# Patient Record
Sex: FEMALE | Race: WHITE
[De-identification: names, ages, dates, MRNs, and addresses within clinical notes are randomized per-mention and may not be internally consistent; named-entity substitution may affect disease eponyms.]

---

## 2019-07-09 ENCOUNTER — HOSPITAL ENCOUNTER (OUTPATIENT)
Dept: HOSPITAL 95 - ORSCSDS | Age: 34
Discharge: HOME | End: 2019-07-09
Attending: INTERNAL MEDICINE
Payer: COMMERCIAL

## 2019-07-09 VITALS — HEIGHT: 65.98 IN | WEIGHT: 130.51 LBS | BODY MASS INDEX: 20.98 KG/M2

## 2019-07-09 DIAGNOSIS — R11.2: ICD-10-CM

## 2019-07-09 DIAGNOSIS — K29.70: ICD-10-CM

## 2019-07-09 DIAGNOSIS — Z79.899: ICD-10-CM

## 2019-07-09 DIAGNOSIS — R63.4: ICD-10-CM

## 2019-07-09 DIAGNOSIS — R10.13: Primary | ICD-10-CM

## 2019-07-09 DIAGNOSIS — J45.909: ICD-10-CM

## 2019-07-09 DIAGNOSIS — F17.210: ICD-10-CM

## 2019-07-09 DIAGNOSIS — B96.81: ICD-10-CM

## 2019-07-09 PROCEDURE — 0DB68ZX EXCISION OF STOMACH, VIA NATURAL OR ARTIFICIAL OPENING ENDOSCOPIC, DIAGNOSTIC: ICD-10-PCS | Performed by: INTERNAL MEDICINE

## 2019-07-09 NOTE — NUR
07/09/19 1543 Jennifer Case S
VITAL SIGNS CHARTED BY ORSC.NSC. ORSC.RXS WAS SIGNED IN COMPUTER AT
THE TIME SO WAS ACCIDENTALLY CHARTED ON HER NAME, [email or web address].

## 2020-10-28 ENCOUNTER — HOSPITAL ENCOUNTER (OUTPATIENT)
Dept: HOSPITAL 95 - LAB | Age: 35
Discharge: HOME | End: 2020-10-28
Attending: OBSTETRICS & GYNECOLOGY
Payer: COMMERCIAL

## 2020-10-28 DIAGNOSIS — Z01.419: Primary | ICD-10-CM

## 2020-10-28 PROCEDURE — G0123 SCREEN CERV/VAG THIN LAYER: HCPCS

## 2020-10-30 LAB — OTHER STN SPEC: (no result)

## 2021-08-04 ENCOUNTER — HOSPITAL ENCOUNTER (EMERGENCY)
Dept: HOSPITAL 95 - ER | Age: 36
Discharge: HOME | End: 2021-08-04
Payer: COMMERCIAL

## 2021-08-04 VITALS — HEIGHT: 66 IN | BODY MASS INDEX: 19.44 KG/M2 | WEIGHT: 120.99 LBS

## 2021-08-04 DIAGNOSIS — E11.9: ICD-10-CM

## 2021-08-04 DIAGNOSIS — J45.909: ICD-10-CM

## 2021-08-04 DIAGNOSIS — K85.90: Primary | ICD-10-CM

## 2021-08-04 DIAGNOSIS — F17.200: ICD-10-CM

## 2021-08-04 LAB
ALBUMIN SERPL BCP-MCNC: 3.5 G/DL (ref 3.4–5)
ALBUMIN/GLOB SERPL: 1.2 {RATIO} (ref 0.8–1.8)
ALT SERPL W P-5'-P-CCNC: 28 U/L (ref 12–78)
ANION GAP SERPL CALCULATED.4IONS-SCNC: 4 MMOL/L (ref 6–16)
AST SERPL W P-5'-P-CCNC: 17 U/L (ref 12–37)
BASOPHILS # BLD AUTO: 0.07 K/MM3 (ref 0–0.23)
BASOPHILS NFR BLD AUTO: 1 % (ref 0–2)
BILIRUB SERPL-MCNC: 0.7 MG/DL (ref 0.1–1)
BUN SERPL-MCNC: 10 MG/DL (ref 8–24)
CALCIUM SERPL-MCNC: 8.6 MG/DL (ref 8.5–10.1)
CHLORIDE SERPL-SCNC: 109 MMOL/L (ref 98–108)
CHOLEST SERPL-MCNC: 127 MG/DL (ref 50–200)
CO2 SERPL-SCNC: 27 MMOL/L (ref 21–32)
CREAT SERPL-MCNC: 0.69 MG/DL (ref 0.4–1)
DEPRECATED RDW RBC AUTO: 43.9 FL (ref 35.1–46.3)
EOSINOPHIL # BLD AUTO: 0.36 K/MM3 (ref 0–0.68)
EOSINOPHIL NFR BLD AUTO: 5 % (ref 0–6)
ERYTHROCYTE [DISTWIDTH] IN BLOOD BY AUTOMATED COUNT: 11.8 % (ref 11.7–14.2)
GLOBULIN SER CALC-MCNC: 3 G/DL (ref 2.2–4)
GLUCOSE SERPL-MCNC: 103 MG/DL (ref 70–99)
HCT VFR BLD AUTO: 40.5 % (ref 33–51)
HGB BLD-MCNC: 13.9 G/DL (ref 11.5–16)
IMM GRANULOCYTES # BLD AUTO: 0.01 K/MM3 (ref 0–0.1)
IMM GRANULOCYTES NFR BLD AUTO: 0 % (ref 0–1)
LYMPHOCYTES # BLD AUTO: 3.18 K/MM3 (ref 0.84–5.2)
LYMPHOCYTES NFR BLD AUTO: 42 % (ref 21–46)
MCHC RBC AUTO-ENTMCNC: 34.3 G/DL (ref 31.5–36.5)
MCV RBC AUTO: 101 FL (ref 80–100)
MONOCYTES # BLD AUTO: 0.5 K/MM3 (ref 0.16–1.47)
MONOCYTES NFR BLD AUTO: 7 % (ref 4–13)
NEUTROPHILS # BLD AUTO: 3.46 K/MM3 (ref 1.96–9.15)
NEUTROPHILS NFR BLD AUTO: 46 % (ref 41–73)
NRBC # BLD AUTO: 0 K/MM3 (ref 0–0.02)
NRBC BLD AUTO-RTO: 0 /100 WBC (ref 0–0.2)
PLATELET # BLD AUTO: 278 K/MM3 (ref 150–400)
POTASSIUM SERPL-SCNC: 3.3 MMOL/L (ref 3.5–5.5)
PROT SERPL-MCNC: 6.5 G/DL (ref 6.4–8.2)
SODIUM SERPL-SCNC: 140 MMOL/L (ref 136–145)
TRIGL SERPL-MCNC: 64 MG/DL (ref 30–140)

## 2021-08-04 PROCEDURE — A9270 NON-COVERED ITEM OR SERVICE: HCPCS

## 2021-09-12 ENCOUNTER — HOSPITAL ENCOUNTER (OUTPATIENT)
Dept: HOSPITAL 95 - ER | Age: 36
Setting detail: OBSERVATION
LOS: 1 days | Discharge: HOME | End: 2021-09-13
Attending: STUDENT IN AN ORGANIZED HEALTH CARE EDUCATION/TRAINING PROGRAM | Admitting: STUDENT IN AN ORGANIZED HEALTH CARE EDUCATION/TRAINING PROGRAM
Payer: COMMERCIAL

## 2021-09-12 VITALS — BODY MASS INDEX: 20.89 KG/M2 | WEIGHT: 130.01 LBS | HEIGHT: 66 IN

## 2021-09-12 DIAGNOSIS — R41.82: ICD-10-CM

## 2021-09-12 DIAGNOSIS — F12.90: ICD-10-CM

## 2021-09-12 DIAGNOSIS — T39.1X1A: ICD-10-CM

## 2021-09-12 DIAGNOSIS — E11.9: ICD-10-CM

## 2021-09-12 DIAGNOSIS — F19.10: ICD-10-CM

## 2021-09-12 DIAGNOSIS — Z91.040: ICD-10-CM

## 2021-09-12 DIAGNOSIS — Z63.8: ICD-10-CM

## 2021-09-12 DIAGNOSIS — F17.210: ICD-10-CM

## 2021-09-12 DIAGNOSIS — Z20.822: ICD-10-CM

## 2021-09-12 DIAGNOSIS — J45.909: ICD-10-CM

## 2021-09-12 DIAGNOSIS — T43.211A: Primary | ICD-10-CM

## 2021-09-12 LAB
ALBUMIN SERPL BCP-MCNC: 3.2 G/DL (ref 3.4–5)
ALBUMIN/GLOB SERPL: 1.1 {RATIO} (ref 0.8–1.8)
ALT SERPL W P-5'-P-CCNC: 33 U/L (ref 12–78)
ANION GAP SERPL CALCULATED.4IONS-SCNC: 3 MMOL/L (ref 6–16)
APAP SERPL-MCNC: 101.7 UG/ML (ref 10–30)
AST SERPL W P-5'-P-CCNC: 26 U/L (ref 12–37)
BASOPHILS # BLD AUTO: 0.07 K/MM3 (ref 0–0.23)
BASOPHILS NFR BLD AUTO: 1 % (ref 0–2)
BILIRUB SERPL-MCNC: 0.8 MG/DL (ref 0.1–1)
BUN SERPL-MCNC: 9 MG/DL (ref 8–24)
CALCIUM SERPL-MCNC: 8 MG/DL (ref 8.5–10.1)
CHLORIDE SERPL-SCNC: 108 MMOL/L (ref 98–108)
CO2 SERPL-SCNC: 26 MMOL/L (ref 21–32)
CREAT SERPL-MCNC: 0.67 MG/DL (ref 0.4–1)
DEPRECATED RDW RBC AUTO: 41.9 FL (ref 35.1–46.3)
EOSINOPHIL # BLD AUTO: 0.25 K/MM3 (ref 0–0.68)
EOSINOPHIL NFR BLD AUTO: 3 % (ref 0–6)
ERYTHROCYTE [DISTWIDTH] IN BLOOD BY AUTOMATED COUNT: 11.5 % (ref 11.7–14.2)
ETHANOL SERPL-MCNC: <3 MG/DL
GLOBULIN SER CALC-MCNC: 3 G/DL (ref 2.2–4)
GLUCOSE SERPL-MCNC: 128 MG/DL (ref 70–99)
HCT VFR BLD AUTO: 37.4 % (ref 33–51)
HGB BLD-MCNC: 13.2 G/DL (ref 11.5–16)
IMM GRANULOCYTES # BLD AUTO: 0.02 K/MM3 (ref 0–0.1)
IMM GRANULOCYTES NFR BLD AUTO: 0 % (ref 0–1)
LYMPHOCYTES # BLD AUTO: 2.87 K/MM3 (ref 0.84–5.2)
LYMPHOCYTES NFR BLD AUTO: 31 % (ref 21–46)
MCHC RBC AUTO-ENTMCNC: 35.3 G/DL (ref 31.5–36.5)
MCV RBC AUTO: 100 FL (ref 80–100)
MONOCYTES # BLD AUTO: 0.69 K/MM3 (ref 0.16–1.47)
MONOCYTES NFR BLD AUTO: 8 % (ref 4–13)
NEUTROPHILS # BLD AUTO: 5.34 K/MM3 (ref 1.96–9.15)
NEUTROPHILS NFR BLD AUTO: 58 % (ref 41–73)
NRBC # BLD AUTO: 0 K/MM3 (ref 0–0.02)
NRBC BLD AUTO-RTO: 0 /100 WBC (ref 0–0.2)
PLATELET # BLD AUTO: 290 K/MM3 (ref 150–400)
POTASSIUM SERPL-SCNC: 3.7 MMOL/L (ref 3.5–5.5)
PROT SERPL-MCNC: 6.2 G/DL (ref 6.4–8.2)
SALICYLATES SERPL-MCNC: 2.3 MG/DL (ref 2.8–20)
SARS-COV-2 RNA RESP QL NAA+PROBE: NEGATIVE
SODIUM SERPL-SCNC: 137 MMOL/L (ref 136–145)
TSH SERPL DL<=0.005 MIU/L-ACNC: 2.25 UIU/ML (ref 0.36–4.8)

## 2021-09-12 PROCEDURE — G0480 DRUG TEST DEF 1-7 CLASSES: HCPCS

## 2021-09-12 PROCEDURE — U0004 COV-19 TEST NON-CDC HGH THRU: HCPCS

## 2021-09-12 SDOH — SOCIAL STABILITY - SOCIAL INSECURITY: OTHER SPECIFIED PROBLEMS RELATED TO PRIMARY SUPPORT GROUP: Z63.8

## 2021-09-13 LAB
ALBUMIN SERPL BCP-MCNC: 3.6 G/DL (ref 3.4–5)
ALBUMIN/GLOB SERPL: 0.9 {RATIO} (ref 0.8–1.8)
ALT SERPL W P-5'-P-CCNC: 36 U/L (ref 12–78)
AMPHETAMINES UR SCN-MCNC: DETECTED NG/ML
AMPHETAMINES UR-MCNC: DETECTED UG/L
ANION GAP SERPL CALCULATED.4IONS-SCNC: 4 MMOL/L (ref 6–16)
APAP SERPL-MCNC: <2 UG/ML (ref 10–30)
AST SERPL W P-5'-P-CCNC: 26 U/L (ref 12–37)
BILIRUB SERPL-MCNC: 0.7 MG/DL (ref 0.1–1)
BUN SERPL-MCNC: 9 MG/DL (ref 8–24)
CALCIUM SERPL-MCNC: 8.4 MG/DL (ref 8.5–10.1)
CANNABINOIDS UR QL: DETECTED
CHLORIDE SERPL-SCNC: 108 MMOL/L (ref 98–108)
CO2 SERPL-SCNC: 28 MMOL/L (ref 21–32)
CREAT SERPL-MCNC: 0.73 MG/DL (ref 0.4–1)
GLOBULIN SER CALC-MCNC: 3.8 G/DL (ref 2.2–4)
GLUCOSE SERPL-MCNC: 87 MG/DL (ref 70–99)
POTASSIUM SERPL-SCNC: 3.5 MMOL/L (ref 3.5–5.5)
PROT SERPL-MCNC: 7.4 G/DL (ref 6.4–8.2)
SODIUM SERPL-SCNC: 140 MMOL/L (ref 136–145)
SP GR SPEC: 1.01 (ref 1–1.02)
UROBILINOGEN UR STRIP-MCNC: (no result) MG/DL

## 2021-09-13 NOTE — NUR
Suicide safety ramona interview and Plan completed.  Pt reports "it was stupid"
amnd is glad she did not die.  No previous attempts.  Father found her being
drowsy and called for help.  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
through drug court 2017.  She relapsed in last month.  She has 3 children 15,
10,9 and lives with her  of 20 years.  She does have anxiety issues and
feels she has isolated herself to just "cookng and cleaning".  She reports
hisory of various family members that have  from suicide, and her
estranged mother was an addict.  She reports her huusband and her were
fighting prior to the attempt, as he learned of ehr relapse.  Both had been in
recovery.  Brief counseling regarding deep breathing, strengths
identification.  She is interested in individual counseling.  Recommend [facility]
folllow up appointment to be made prior to DC, with emphasis in substance
abuse.  Pt has ceased activities she used to enjoy of attending [employer],
attending [employer].  Pt was tearful at times when discussing how she
has "lost herself" and does not engage in any activities that are just for
her. Plan given to  patient.  Oly Pino M.Ed., [facility], Behavior Health
Director.

## 2021-09-13 NOTE — NUR
Spoke with Behavioral Health Director Oly Pino regarding her care this am.
Patient was pleasant evaluation during [facility] eval. Advised that she has
experienced a relapse with methamphetamine use roughly 30 days ago after 7
years clean. Had argument yesterday with her  regarding relapse  which
led to OD. Pt. open to one on one substance abuse canceling. Declines group
sessions. Per Oly, pt. likely to be released from a psych standpoint as she
has declined and intent to harm herself at this time. She is also seeking
help.
 
Patient's father Jaxon called to check on patient. He advised that he will
provide transport for pt. if her  is unavailable when she is
discharged.
Pt. note yet appropriate for discharge from clinical standpoint. Pt. declined
blood draw this am. Is now agreeable to have labs drawn. Anticipated needs at
time of discharge to include: Substance abuse counseling through [facility]
([facility] preferred) and hospital F/U with PCP within 5-7 days post
discharge.

## 2021-09-13 NOTE — NUR
Per Dr. Aleman, pt. appropriate for discharge. I was unable to make it to
patient's room prior to discharge. Pt. will need appt. with [facility] for
substance abuse counceling and hospital F/U with PCP within the next 5-7 days.
[facility] team and PCP notified.

## 2021-09-13 NOTE — NUR
Patient was involved in safety planning and very open to planning for her
wellness.  She was afraid of having to attend groups, and voiced relief that
individual therapy was available.  She prefers if she could be seen in
[place].  Pt reported she had just woken up this am when blood draw tried.
She said she was not nice, and felt bad -but was glad the labs could be drawn
again.

## 2021-09-13 NOTE — NUR
SHIFT SUMMARY
A/OX3, PT SLEEPING MOST OF SHIFT. MOD SI PRECAUTIONS IN PLACE, PT DENIES ANY
SUICIDAL THOUGHTS AT THIS TIME. LABILE MOOD WITH BRIEF PERIODS OF AGITATION
D/T SI PRECAUTIONS AND POLICIES. VSS, NO ACUTE CHANGES AT THIS TIME. BED IN
LOWEST POSITION WITH CALL LIGHT IN REACH. WILL CONTINUE TO MONITOR AND REPORT
TO ONCOMING RN.

## 2021-09-13 NOTE — NUR
PT IS ALERT ORIENTED X 4, PT DENIES PAIN,N/V,SOB,AND ANY SUICIDAL IDEAS,VERY
PLEASANT AND COORPORATIVE, PT STATED IT WAS A STUPID IDEA . PT IS IN BED,BED
IN LOW POSITION, WILL CONTINUE TO MONITOR

## 2021-09-13 NOTE — NUR
UPDATE 09/13/21: PT. PRESENTED TO ED YESTERDAY VIA EMS AFTER OD OF TRAZADONE
AND TYLENOL. PER CHART REVIEW, PT. INFORMED DR. LAZCANO THAT OD WAS
UNINTENTIONAL AND THAT SHE WAS HAVING A "BAD DAY." PT. WAS PLACED ON
APPROPRIATE PRECAUTIONS DUE TO CONCERN FOR POTENTIAL RISK OF SI. [facility] EVAL
REQUESTED BY DR. LAZCANO.
 
PER CHART REVIEW, THIS AM PT. HAS REFUSED LABS. PT. HAS BEEN AGITATED
THROUGHOUT HER HOSPITAL STAY. ACCUSING STAFF OFF TAKING HER PHONE AND
FRUSTRATED WITH THE NEED FOR SAFETY PRECAUTIONS. PT. HAS BEEN IN CONTACT WITH
HER . PT. POTENTIALLY APPROPRIATE FOR D/C WITHIN THE NEXT 24-48 HOURS.
ANTICIPATE NEEDS AT TIME OF DISCHARGE TO INCLUDE: HOSPITAL F/U WITHIN 5-7 DAYS
WITH PCP AND C F/U APPOINTMENT.

## 2021-09-14 ENCOUNTER — HOSPITAL ENCOUNTER (OUTPATIENT)
Dept: HOSPITAL 95 - LAB SHORT | Age: 36
Discharge: HOME | End: 2021-09-14
Attending: STUDENT IN AN ORGANIZED HEALTH CARE EDUCATION/TRAINING PROGRAM
Payer: COMMERCIAL

## 2021-09-14 DIAGNOSIS — Z51.81: Primary | ICD-10-CM

## 2021-09-14 DIAGNOSIS — Z79.899: ICD-10-CM

## 2021-09-14 PROCEDURE — G0480 DRUG TEST DEF 1-7 CLASSES: HCPCS

## 2021-09-22 ENCOUNTER — HOSPITAL ENCOUNTER (OUTPATIENT)
Dept: HOSPITAL 95 - LAB | Age: 36
Discharge: HOME | End: 2021-09-22
Attending: STUDENT IN AN ORGANIZED HEALTH CARE EDUCATION/TRAINING PROGRAM
Payer: COMMERCIAL

## 2021-09-22 DIAGNOSIS — R10.9: Primary | ICD-10-CM

## 2021-09-22 DIAGNOSIS — Z79.899: ICD-10-CM

## 2021-09-22 LAB — WBC #/AREA URNS HPF: (no result) /HPF (ref 0–5)

## 2022-06-11 ENCOUNTER — HOSPITAL ENCOUNTER (INPATIENT)
Dept: HOSPITAL 95 - ER | Age: 37
LOS: 5 days | Discharge: HOME | DRG: 920 | End: 2022-06-16
Attending: INTERNAL MEDICINE | Admitting: HOSPITALIST
Payer: COMMERCIAL

## 2022-06-11 VITALS — WEIGHT: 130.01 LBS | HEIGHT: 66 IN | BODY MASS INDEX: 20.89 KG/M2

## 2022-06-11 DIAGNOSIS — Z79.899: ICD-10-CM

## 2022-06-11 DIAGNOSIS — Z98.51: ICD-10-CM

## 2022-06-11 DIAGNOSIS — E11.9: ICD-10-CM

## 2022-06-11 DIAGNOSIS — J45.909: ICD-10-CM

## 2022-06-11 DIAGNOSIS — N73.8: ICD-10-CM

## 2022-06-11 DIAGNOSIS — Z20.822: ICD-10-CM

## 2022-06-11 DIAGNOSIS — K91.89: ICD-10-CM

## 2022-06-11 DIAGNOSIS — K56.7: ICD-10-CM

## 2022-06-11 DIAGNOSIS — K91.872: Primary | ICD-10-CM

## 2022-06-11 DIAGNOSIS — F17.210: ICD-10-CM

## 2022-06-11 DIAGNOSIS — Z91.040: ICD-10-CM
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
## 2022-06-11 LAB
ALBUMIN SERPL BCP-MCNC: 2.6 G/DL (ref 3.4–5)
ALBUMIN/GLOB SERPL: 0.6 {RATIO} (ref 0.8–1.8)
ALT SERPL W P-5'-P-CCNC: 31 U/L (ref 12–78)
ANION GAP SERPL CALCULATED.4IONS-SCNC: 5 MMOL/L (ref 6–16)
AST SERPL W P-5'-P-CCNC: 35 U/L (ref 12–37)
BASOPHILS # BLD AUTO: 0.05 K/MM3 (ref 0–0.23)
BASOPHILS NFR BLD AUTO: 0 % (ref 0–2)
BILIRUB SERPL-MCNC: 0.5 MG/DL (ref 0.1–1)
BUN SERPL-MCNC: 7 MG/DL (ref 8–24)
CALCIUM SERPL-MCNC: 9.1 MG/DL (ref 8.5–10.1)
CHLORIDE SERPL-SCNC: 105 MMOL/L (ref 98–108)
CO2 SERPL-SCNC: 30 MMOL/L (ref 21–32)
CREAT SERPL-MCNC: 0.6 MG/DL (ref 0.4–1)
DEPRECATED RDW RBC AUTO: 48 FL (ref 35.1–46.3)
EOSINOPHIL # BLD AUTO: 0.33 K/MM3 (ref 0–0.68)
EOSINOPHIL NFR BLD AUTO: 3 % (ref 0–6)
ERYTHROCYTE [DISTWIDTH] IN BLOOD BY AUTOMATED COUNT: 12.7 % (ref 11.7–14.2)
GLOBULIN SER CALC-MCNC: 4.6 G/DL (ref 2.2–4)
GLUCOSE SERPL-MCNC: 113 MG/DL (ref 70–99)
HCT VFR BLD AUTO: 37.1 % (ref 33–51)
HGB BLD-MCNC: 12.5 G/DL (ref 11.5–16)
IMM GRANULOCYTES # BLD AUTO: 0.05 K/MM3 (ref 0–0.1)
IMM GRANULOCYTES NFR BLD AUTO: 0 % (ref 0–1)
LYMPHOCYTES # BLD AUTO: 2.06 K/MM3 (ref 0.84–5.2)
LYMPHOCYTES NFR BLD AUTO: 18 % (ref 21–46)
MCHC RBC AUTO-ENTMCNC: 33.7 G/DL (ref 31.5–36.5)
MCV RBC AUTO: 101 FL (ref 80–100)
MONOCYTES # BLD AUTO: 0.78 K/MM3 (ref 0.16–1.47)
MONOCYTES NFR BLD AUTO: 7 % (ref 4–13)
NEUTROPHILS # BLD AUTO: 8.52 K/MM3 (ref 1.96–9.15)
NEUTROPHILS NFR BLD AUTO: 72 % (ref 41–73)
NRBC # BLD AUTO: 0 K/MM3 (ref 0–0.02)
NRBC BLD AUTO-RTO: 0 /100 WBC (ref 0–0.2)
PLATELET # BLD AUTO: 617 K/MM3 (ref 150–400)
POTASSIUM SERPL-SCNC: 3.7 MMOL/L (ref 3.5–5.5)
PROT SERPL-MCNC: 7.2 G/DL (ref 6.4–8.2)
SODIUM SERPL-SCNC: 140 MMOL/L (ref 136–145)

## 2022-06-11 PROCEDURE — A9270 NON-COVERED ITEM OR SERVICE: HCPCS

## 2022-06-11 PROCEDURE — C9113 INJ PANTOPRAZOLE SODIUM, VIA: HCPCS

## 2022-06-12 LAB
ALBUMIN SERPL BCP-MCNC: 2.1 G/DL (ref 3.4–5)
ALBUMIN/GLOB SERPL: 0.6 {RATIO} (ref 0.8–1.8)
ALT SERPL W P-5'-P-CCNC: 26 U/L (ref 12–78)
ANION GAP SERPL CALCULATED.4IONS-SCNC: 6 MMOL/L (ref 6–16)
AST SERPL W P-5'-P-CCNC: 31 U/L (ref 12–37)
BASOPHILS # BLD AUTO: 0.06 K/MM3 (ref 0–0.23)
BASOPHILS NFR BLD AUTO: 1 % (ref 0–2)
BILIRUB SERPL-MCNC: 0.6 MG/DL (ref 0.1–1)
BUN SERPL-MCNC: 6 MG/DL (ref 8–24)
CALCIUM SERPL-MCNC: 8.1 MG/DL (ref 8.5–10.1)
CHLORIDE SERPL-SCNC: 108 MMOL/L (ref 98–108)
CO2 SERPL-SCNC: 26 MMOL/L (ref 21–32)
CREAT SERPL-MCNC: 0.56 MG/DL (ref 0.4–1)
DEPRECATED RDW RBC AUTO: 47.8 FL (ref 35.1–46.3)
EOSINOPHIL # BLD AUTO: 0.36 K/MM3 (ref 0–0.68)
EOSINOPHIL NFR BLD AUTO: 3 % (ref 0–6)
ERYTHROCYTE [DISTWIDTH] IN BLOOD BY AUTOMATED COUNT: 12.6 % (ref 11.7–14.2)
FLUAV RNA SPEC QL NAA+PROBE: NEGATIVE
FLUBV RNA SPEC QL NAA+PROBE: NEGATIVE
GLOBULIN SER CALC-MCNC: 3.7 G/DL (ref 2.2–4)
GLUCOSE SERPL-MCNC: 103 MG/DL (ref 70–99)
HCT VFR BLD AUTO: 33.7 % (ref 33–51)
HGB BLD-MCNC: 11 G/DL (ref 11.5–16)
IMM GRANULOCYTES # BLD AUTO: 0.05 K/MM3 (ref 0–0.1)
IMM GRANULOCYTES NFR BLD AUTO: 1 % (ref 0–1)
LYMPHOCYTES # BLD AUTO: 2.28 K/MM3 (ref 0.84–5.2)
LYMPHOCYTES NFR BLD AUTO: 22 % (ref 21–46)
MCHC RBC AUTO-ENTMCNC: 32.6 G/DL (ref 31.5–36.5)
MCV RBC AUTO: 103 FL (ref 80–100)
MONOCYTES # BLD AUTO: 0.68 K/MM3 (ref 0.16–1.47)
MONOCYTES NFR BLD AUTO: 6 % (ref 4–13)
NEUTROPHILS # BLD AUTO: 7.16 K/MM3 (ref 1.96–9.15)
NEUTROPHILS NFR BLD AUTO: 68 % (ref 41–73)
NRBC # BLD AUTO: 0 K/MM3 (ref 0–0.02)
NRBC BLD AUTO-RTO: 0 /100 WBC (ref 0–0.2)
PLATELET # BLD AUTO: 545 K/MM3 (ref 150–400)
POTASSIUM SERPL-SCNC: 3.3 MMOL/L (ref 3.5–5.5)
PROT SERPL-MCNC: 5.8 G/DL (ref 6.4–8.2)
PROT UR STRIP-MCNC: (no result) MG/DL
RSV RNA SPEC QL NAA+PROBE: NEGATIVE
SARS-COV-2 RNA RESP QL NAA+PROBE: NEGATIVE
SODIUM SERPL-SCNC: 140 MMOL/L (ref 136–145)
SP GR SPEC: 1.01 (ref 1–1.02)
UROBILINOGEN UR STRIP-MCNC: (no result) MG/DL

## 2022-06-12 NOTE — NUR
summary
pt admitted per er with distended abd and pain post surgery earlier in week
for perforated ulcer-surery was in [place]. pt groggy.answere some questions
then falls to sleep.pt inconsistent with answers at time. i was completing
admit assess and computer locked me out. will not allow me back in-day rn
fauzia to complete. pt reports if surgery was needed, she would want to go
back to [place] for sugery.day rn aware.

## 2022-06-12 NOTE — NUR
SHIFT SUMMARY
PT A&OX4, VSS/RA, DARWIN PO CLEAR LIQUID DIET, VOIDING WELL, 3 BMS TODAY. PAIN
MANAGED WITH 5 MG OXY AND 25 FENT Q4 PRN. ADMITTED FOR POSTOP ILEUS AND
ABSCESS, GEN SURGERY CONSULT: NO SURGICAL INTERVENTION PLANNED AT THIS TIME.
PT IS POD6 PERF GASTRIC ULCER, HAS STAPLES MIDLINE NATALEE; SURGERY DONE [facility]. WILL REPORT TO ONCOMING NOC RN.

## 2022-06-13 LAB
ANION GAP SERPL CALCULATED.4IONS-SCNC: 6 MMOL/L (ref 6–16)
BASOPHILS # BLD AUTO: 0.06 K/MM3 (ref 0–0.23)
BASOPHILS NFR BLD AUTO: 1 % (ref 0–2)
BUN SERPL-MCNC: 3 MG/DL (ref 8–24)
CALCIUM SERPL-MCNC: 8.5 MG/DL (ref 8.5–10.1)
CHLORIDE SERPL-SCNC: 108 MMOL/L (ref 98–108)
CO2 SERPL-SCNC: 25 MMOL/L (ref 21–32)
CREAT SERPL-MCNC: 0.47 MG/DL (ref 0.4–1)
DEPRECATED RDW RBC AUTO: 46.9 FL (ref 35.1–46.3)
EOSINOPHIL # BLD AUTO: 0.3 K/MM3 (ref 0–0.68)
EOSINOPHIL NFR BLD AUTO: 3 % (ref 0–6)
ERYTHROCYTE [DISTWIDTH] IN BLOOD BY AUTOMATED COUNT: 12.5 % (ref 11.7–14.2)
GLUCOSE SERPL-MCNC: 101 MG/DL (ref 70–99)
HCT VFR BLD AUTO: 34.1 % (ref 33–51)
HGB BLD-MCNC: 11.2 G/DL (ref 11.5–16)
IMM GRANULOCYTES # BLD AUTO: 0.03 K/MM3 (ref 0–0.1)
IMM GRANULOCYTES NFR BLD AUTO: 0 % (ref 0–1)
LYMPHOCYTES # BLD AUTO: 2.22 K/MM3 (ref 0.84–5.2)
LYMPHOCYTES NFR BLD AUTO: 23 % (ref 21–46)
MCHC RBC AUTO-ENTMCNC: 32.8 G/DL (ref 31.5–36.5)
MCV RBC AUTO: 102 FL (ref 80–100)
MONOCYTES # BLD AUTO: 0.58 K/MM3 (ref 0.16–1.47)
MONOCYTES NFR BLD AUTO: 6 % (ref 4–13)
NEUTROPHILS # BLD AUTO: 6.53 K/MM3 (ref 1.96–9.15)
NEUTROPHILS NFR BLD AUTO: 67 % (ref 41–73)
NRBC # BLD AUTO: 0 K/MM3 (ref 0–0.02)
NRBC BLD AUTO-RTO: 0 /100 WBC (ref 0–0.2)
PLATELET # BLD AUTO: 621 K/MM3 (ref 150–400)
POTASSIUM SERPL-SCNC: 4.2 MMOL/L (ref 3.5–5.5)
PROTHROMBIN TIME: 10.9 SEC (ref 9.7–11.5)
SODIUM SERPL-SCNC: 139 MMOL/L (ref 136–145)

## 2022-06-13 NOTE — NUR
PT HAVING BMS TONIGHT.ALTHOUGH,STILL REQUIRING IV PAIN MEDS AT TIMES AND IV
ZOFRAN FOR NAUSEA X2.PT DOES HAVE HX OF ANXIETY AND REPORTS ANXIETY REGARDING
POSSIBILITY OF GOING HOME SOON.SLEEPING QUIETLY AT THIS TIME.

## 2022-06-13 NOTE — NUR
SHIFT SUMMARY
ANXIOUS THROUGHOUT SHIFT. TOLERATING SIPS OF FULL LIQUIDS. REPORTS NAUSEA AND
BACK AND ABD PAIN. MEDICATED PER EMAR. BOWEL CARE STARTED. IV FLUIDS RUNNING.
VOIDING WELL. REPORTS BOWEL MOVEMENT AND GAS. PLAN FOR DRAIN OF FLUID
COLLECTION IN PELVIS BY RADIOLOGY TOMORROW 06/14/22. SEEN BY SPIRITUAL CARE
THIS SHIFT. PALLIATIVE CARE CONSULTED AND WILL SEE TOMORROW AM.

## 2022-06-13 NOTE — NUR
Spritual care consult received and processed. Patient is just returning to her
rm after a failed attempt at a procedure. Pt is defeated and exhausted. Pt
reveals that she has low coping skills and unhelpful yet well meaning
resources. She talks about how spun out she is over having complications with
her health. I explore with her some mindfulness practices and breathing
techniques which put her to sleep (along with the Ativan given earlier). Pt
becomes so sleepy that she begins to spill her soup on the way to her mouth. I
help her return her soup to the tray and allow to rest. Spiritual care will be
available next day.

## 2022-06-14 PROCEDURE — 0W9J30Z DRAINAGE OF PELVIC CAVITY WITH DRAINAGE DEVICE, PERCUTANEOUS APPROACH: ICD-10-PCS | Performed by: RADIOLOGY

## 2022-06-14 NOTE — NUR
POST DRAIN PLACEMENT
VERY TEARFULLY TRANSFERS SELF FROM GOURNEY TO BED. URECIL BAG w/ SS FLUID TO R
BUTTOCK; COMPRESSED. KPAD TO BUTTOCK, REPOSITIONS SELF TO L SIDE. EMOTIONAL &
UPSET SHE IS IN PAIN. REPEATS "OW" OVER & OVER AGAIN. ICE WATER & PO MEDS
GIVEN.

## 2022-06-14 NOTE — NUR
PATIENT IS AGREABLE TO THE DRAIN PLACEMENT TODAY. PT IS STILL VERY ANXIOUS
ABOUT IT. PO ATIVAN READY JUST PRIOR TO PROCEDURE

## 2022-06-14 NOTE — NUR
SHIFT SUMMARY
 
NO ACUTE CHANGES. PT CONTINUES TO HAVE CRAMPING ABD PAIN. PT REPORTS FLATUS,
BUT NO BMS THIS SHIFT. STAPLES TO ABD REMAIN NATALEE AND CDI. NPO AT MIDNIGHT FOR
IR DRAIN PLACEMENT TODAY. IVF INFUSING PER ORDERS. 1 ROXICODONE + TYLENOL FOR
PAIN. PT STILL VERY ANXIOUS AND EMOTIONAL ABOUT TREATMENT PLAN. SUPPORT PRN.
USES CALL LIGHT APPROPRIATELY.

## 2022-06-14 NOTE — NUR
SHIFT SUMMARY
PT VERY ANXIOUS TODAY, REPORTS PROCEDURE NOT HAPPENING THEN DECIDED IT WAS AND
INSTRUCTED NURSING STAFF AND EFM PROVIDER TO  MAKE SURE IT HAPPENS EVEN IF SHE
SAYS NO IN THE FUTURE. PT MEDICATED WITH ATIVAN AS ORDERED JUST PRIOR TO
PROCEDURE FOR ANXIETY, DRAIN PLACED AND SAMPLE SENT TO LAB, PT RETURNED TO
ROOM VERY PAINFUL AND WAS MEDICATED PER EMAR, PT SO AT BEDSIDE OFFERING
COMFORT AND SUPPORT. SURGEON REMOVED STAPLES FROM MIDLINE INCISION AND PLACED
STERI STRIPS. PT FED DINNER PER SURGEON OK AND TOLERATED WELL. NO OTHER EVENTS
THIS SHIFT, CALL LIGHT IN REACH, WILL CTM AND REPORT TO ONCOMING NOC RN.

## 2022-06-14 NOTE — NUR
NEW POLST completed with pt. Proxy decision maker is TAZ Landaverde, cousin
to pt. Ph # [phone].  Pt requersts Full Code and full treatment
for duration of time that she has normal brain activity detected.
Pal Care visit made to complete a POLST and review completion of an AD
with pt at her request. Pt had a spiritual care visit just prior to my visit.
Pt sitting cross-legged in bed and rocked back and forth t/o most of visit.
When she focused on conversation re: advanced care planning and AD she stopped
rocking. She has vacilated in past 24 hours whether or not to proceed with
procedure that is scary and anxiety producing to her. This morning she has
decided against it.  She wants to name a surrogate decision maker in the event
that she is unable to speak for herself. She does not want to burden her
father with that. He is her primary family member locally. She named her
Cousin, TAZ Landaverde, Ph# [phone] as her proxy decision maker. TAZ resides in
[facility]. She does not want her estranged or ex- to have any
information or say in her medical decision making. This was of great concern
to her. Pt's proxy lives out of state and pt did not have witnesses locally to
complete an AD with. We reviewed a blank one and how to complete it. I placed
a blank AD in pt's belongings bag so she could review with her cousin and dad
and complete at a later date. Pt expressed thanks for getting the POLST done
and her proxy decision maker documented.

## 2022-06-14 NOTE — NUR
Spiritual Care Referral follow Up.
Pt. is sitting up "[place] Style" in bed and rocking.  Pt. welcomes my visit,
and verbalizes gratitude for iliana White's referral to have this 
return. Pt. is unsettled by unclear diagnosis, and lack of information. Pt.
displays evidence of increased discomfort.  Pt. also verbalized a lack of
family support, and a broken family system. Through a calming presence and
theraputic listening, Pt.  displays evidence of reduced anxiety. With Pts.
permission, prayed with Pt.  Pt. verbalized gratitude for the spiritau care
visit.

## 2022-06-15 NOTE — NUR
SHIFT SUMMARY
 
NO ACUTE CHANGES. 1 ROXICODONE + TYLENOL FOR PAIN MANAGEMENT. TRANSGLUTEAL
DRAIN TO RIGHT BUTTOCK REMAINS INTACT AND PUTTING OUT LIGHT TAN/BROWN
DRAINAGE. 1 SBA TO BATHROOM. ABX PER ORDERS. CALL LIGHT WITHIN REACH.

## 2022-06-15 NOTE — NUR
SUMMARY: PT IS POD1 PIGTAIL DRAIN PLACEMENT. NO ACUTE CHANGE TODAY, VSS, A/O.
PT SLEEPING OFTEN TODAY AND IF NOT SLEEPING THAN OUTSIDE WITH BOYFRIEND.
SURGICAL SITES WNL. MINIMAL SS OUTPUT FROM DRAIN. PT CONTINUES TO BE PAINFUL,
BUT X2 ROXICODONE AND TYLENOL HAS SEEMED TO REDUCE PAIN. NO SAFETY CONCERNS,
WILL CTM AND REPORT TO NOC RN.

## 2022-06-16 NOTE — NUR
pt laying in bed awake a/ox3, cooperative with care, states she's in 9/10
pain, had a really rough night last night, is scheduled to go to ct this am,
medicated with fentanyl for pain relief, having axiety about her condition
reports she doesn't feel like she's getting better, and about the ct scan,
lungs are clear a bit dim in bases, denies cough, on r/a, resp even and
unlabored, no cough noted, hrr, no edema noted, ppp+2, cap refill <3sec, v
stable, afebrile, piv site to rac is clear and patent, abd flat soft tender,
has drain to buttock, midline incision, healing, reports no bm for two days,
skin c/w/d, maew, able to stand and ambulate to bathroom, call light in reach,
left for ct via wheelchair.

## 2022-06-16 NOTE — NUR
Pt has been discharged to home, removed iv intact, went over her discharge
paperwork, she verbalized understanding, hard copy scripts placed in her
discharge pkt, she is aware, new medication called into [facility], pt
waiting on a ride.

## 2022-06-16 NOTE — NUR
PT IS A&OX4, SBA TO BR AND IS ABLE TO MAKE NEEDS KNOWN. PT HAS DRAIN IN R
TRANSGLUTEAL DRAIN, PUTTING OR SCANT SEROSANG DRAINAGE. NO BM THIS SHIFT, PT
COMPLAINS OF ABD PAIN AND STATES "I CAN FEEL THE DRAIN IN ME," AND "THE DRAIN
IS CAUSING ME PAIN." PRN OXY 10 MG GIVEN TO PT AS WELL AS FENTANYL AND ZOFRAN.
PT ABLE TO AMBULATE THE HALLS INDEPENDENTLY PRIOR TO SLEEPING. RESTS BETWEEN
CARES, SLEEPS 6+ HOURS. WILL CONTINUE TO MONITOR.

## 2023-03-28 ENCOUNTER — HOSPITAL ENCOUNTER (EMERGENCY)
Dept: HOSPITAL 95 - ER | Age: 38
Discharge: HOME | End: 2023-03-28
Payer: COMMERCIAL

## 2023-03-28 VITALS — WEIGHT: 150 LBS | BODY MASS INDEX: 24.11 KG/M2 | HEIGHT: 66 IN
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
## 2023-03-28 DIAGNOSIS — E11.9: ICD-10-CM

## 2023-03-28 DIAGNOSIS — Z91.040: ICD-10-CM

## 2023-03-28 DIAGNOSIS — S82.61XA: Primary | ICD-10-CM

## 2023-03-28 DIAGNOSIS — F17.210: ICD-10-CM

## 2023-03-28 DIAGNOSIS — Z79.899: ICD-10-CM

## 2023-03-28 PROCEDURE — A9270 NON-COVERED ITEM OR SERVICE: HCPCS

## 2025-07-17 ENCOUNTER — HOSPITAL ENCOUNTER (EMERGENCY)
Dept: HOSPITAL 95 - ER | Age: 40
Discharge: HOME | End: 2025-07-17
Payer: COMMERCIAL

## 2025-07-17 VITALS — SYSTOLIC BLOOD PRESSURE: 120 MMHG | DIASTOLIC BLOOD PRESSURE: 85 MMHG

## 2025-07-17 VITALS — WEIGHT: 145 LBS | BODY MASS INDEX: 23.3 KG/M2 | HEIGHT: 66 IN

## 2025-07-17 DIAGNOSIS — R10.13: Primary | ICD-10-CM

## 2025-07-17 DIAGNOSIS — Z79.899: ICD-10-CM

## 2025-07-17 DIAGNOSIS — J45.909: ICD-10-CM

## 2025-07-17 DIAGNOSIS — F17.210: ICD-10-CM

## 2025-07-17 DIAGNOSIS — E11.9: ICD-10-CM

## 2025-07-17 DIAGNOSIS — Z91.040: ICD-10-CM

## 2025-07-17 LAB
ALBUMIN SERPL BCP-MCNC: 3.8 G/DL (ref 3.4–5)
ALBUMIN/GLOB SERPL: 1 {RATIO} (ref 0.8–1.8)
ALP SERPL-CCNC: 136 U/L (ref 50–136)
ALT SERPL W P-5'-P-CCNC: 27 U/L (ref 12–78)
ANION GAP SERPL CALCULATED.4IONS-SCNC: 8 MMOL/L (ref 3–11)
AST SERPL W P-5'-P-CCNC: 23 U/L (ref 12–37)
BASOPHILS # BLD AUTO: 0.04 K/MM3 (ref 0–0.23)
BASOPHILS # BLD: (no result) K/MM3 (ref 0–0.23)
BASOPHILS NFR BLD AUTO: 1 % (ref 0–2)
BASOPHILS NFR BLD: (no result) % (ref 0–2)
BILIRUB SERPL-MCNC: 1 MG/DL (ref 0.1–1)
BLASTS NFR BLD MANUAL: (no result) % (ref 0–0)
BUN SERPL-MCNC: 9 MG/DL (ref 8–24)
BUN/CREAT SERPL: 14 % (ref 12–20)
CALCIUM SERPL-MCNC: 8.5 MG/DL (ref 8.5–10.1)
CHLORIDE SERPL-SCNC: 109 MMOL/L (ref 98–108)
CO2 SERPL-SCNC: 24 MMOL/L (ref 21–32)
CREAT SERPL-MCNC: 0.64 MG/DL (ref 0.4–1)
DEPRECATED RDW RBC AUTO: 41.1 FL (ref 35.1–46.3)
EOSINOPHIL # BLD AUTO: 0.08 K/MM3 (ref 0–0.68)
EOSINOPHIL # BLD: (no result) K/MM3 (ref 0–0.68)
EOSINOPHIL NFR BLD AUTO: 1 % (ref 0–6)
EOSINOPHIL NFR BLD: (no result) % (ref 0–6)
ERYTHROCYTE [DISTWIDTH] IN BLOOD BY AUTOMATED COUNT: 11.9 % (ref 11.7–14.2)
GFR SERPL CREATININE-BSD FRML MDRD: 115 ML/MIN/{1.73_M2} (ref 60–?)
GLOBULIN SER CALC-MCNC: 3.8 G/DL (ref 2.2–4)
GLUCOSE SERPL-MCNC: 115 MG/DL (ref 70–99)
HCT VFR BLD AUTO: 41.5 % (ref 33–51)
HGB BLD-MCNC: 14.4 G/DL (ref 11.5–16)
IMM GRANULOCYTES # BLD AUTO: 0.01 K/MM3 (ref 0–0.1)
IMM GRANULOCYTES NFR BLD AUTO: 0 % (ref 0–1)
LYMPHOCYTES # BLD AUTO: 1.71 K/MM3 (ref 0.84–5.2)
LYMPHOCYTES # BLD: (no result) K/MM3 (ref 0.84–5.2)
LYMPHOCYTES NFR BLD AUTO: 29 % (ref 21–46)
LYMPHOCYTES NFR BLD: (no result) % (ref 21–46)
MCH RBC QN AUTO: 32.7 PG (ref 26–34)
MCHC RBC AUTO-ENTMCNC: 34.7 G/DL (ref 31.5–36.5)
MCV RBC AUTO: 94 FL (ref 80–100)
METAMYELOCYTES # BLD MANUAL: (no result) K/MM3 (ref 0–0)
METAMYELOCYTES NFR BLD MANUAL: (no result) % (ref 0–0)
MONOCYTES # BLD AUTO: 0.47 K/MM3 (ref 0.16–1.47)
MONOCYTES # BLD: (no result) K/MM3 (ref 0.16–1.47)
MONOCYTES NFR BLD AUTO: 8 % (ref 4–13)
MONOCYTES NFR BLD: (no result) % (ref 4–13)
MYELOCYTES # BLD MANUAL: (no result) K/MM3 (ref 0–0)
MYELOCYTES NFR BLD MANUAL: (no result) % (ref 0–0)
NEUTROPHILS # BLD AUTO: 3.52 K/MM3 (ref 1.96–9.15)
NEUTROPHILS NFR BLD AUTO: 60 % (ref 41–73)
NEUTS BAND NFR BLD MANUAL: (no result) % (ref 0–8)
NEUTS SEG # BLD MANUAL: (no result) K/MM3 (ref 1.96–9.15)
NEUTS SEG NFR BLD MANUAL: (no result) % (ref 41–73)
NRBC # BLD AUTO: 0 K/MM3 (ref 0–0.02)
NRBC BLD AUTO-RTO: 0 /100 WBC (ref 0–0.2)
PLASMA CELLS # BLD MANUAL: (no result) K/MM3 (ref 0–0)
PLASMA CELLS NFR BLD: (no result) % (ref 0–0)
PLATELET # BLD AUTO: 273 K/MM3 (ref 150–400)
PMV BLD AUTO: 8.9 FL (ref 9.1–12.4)
POTASSIUM SERPL-SCNC: 3.9 MMOL/L (ref 3.5–5.5)
PROMYELOCYTES # BLD MANUAL: (no result) K/MM3 (ref 0–0)
PROMYELOCYTES NFR BLD MANUAL: (no result) % (ref 0–0)
PROT SERPL-MCNC: 7.6 G/DL (ref 6.4–8.2)
RBC # BLD AUTO: 4.41 M/MM3 (ref 3.8–5.2)
SODIUM SERPL-SCNC: 137 MMOL/L (ref 136–145)
TOTAL CELLS COUNTED BLD: (no result)
VARIANT LYMPHS NFR BLD MANUAL: (no result) % (ref 0–0)
WBC # BLD AUTO: 5.83 K/MM3 (ref 4–11.3)
WBC OTHER NFR BLD MANUAL: (no result) % (ref 0–0)

## 2025-07-17 PROCEDURE — A9270 NON-COVERED ITEM OR SERVICE: HCPCS
